# Patient Record
Sex: FEMALE | Race: BLACK OR AFRICAN AMERICAN
[De-identification: names, ages, dates, MRNs, and addresses within clinical notes are randomized per-mention and may not be internally consistent; named-entity substitution may affect disease eponyms.]

---

## 2018-08-29 ENCOUNTER — HOSPITAL ENCOUNTER (EMERGENCY)
Dept: HOSPITAL 74 - JER | Age: 43
Discharge: HOME | End: 2018-08-29
Payer: COMMERCIAL

## 2018-08-29 VITALS — SYSTOLIC BLOOD PRESSURE: 113 MMHG | TEMPERATURE: 98.9 F | HEART RATE: 57 BPM | DIASTOLIC BLOOD PRESSURE: 69 MMHG

## 2018-08-29 VITALS — BODY MASS INDEX: 33.2 KG/M2

## 2018-08-29 DIAGNOSIS — R10.30: Primary | ICD-10-CM

## 2018-08-29 LAB
ALBUMIN SERPL-MCNC: 3.8 G/DL (ref 3.4–5)
ALP SERPL-CCNC: 54 U/L (ref 45–117)
ALT SERPL-CCNC: 26 U/L (ref 12–78)
ANION GAP SERPL CALC-SCNC: 10 MMOL/L (ref 8–16)
APPEARANCE UR: (no result)
AST SERPL-CCNC: 18 U/L (ref 15–37)
BASOPHILS # BLD: 0.7 % (ref 0–2)
BILIRUB SERPL-MCNC: 0.4 MG/DL (ref 0.2–1)
BILIRUB UR STRIP.AUTO-MCNC: NEGATIVE MG/DL
BUN SERPL-MCNC: 9 MG/DL (ref 7–18)
CALCIUM SERPL-MCNC: 8.9 MG/DL (ref 8.5–10.1)
CHLORIDE SERPL-SCNC: 106 MMOL/L (ref 98–107)
CO2 SERPL-SCNC: 24 MMOL/L (ref 21–32)
COLOR UR: YELLOW
CREAT SERPL-MCNC: 0.6 MG/DL (ref 0.55–1.02)
DEPRECATED RDW RBC AUTO: 15 % (ref 11.6–15.6)
EOSINOPHIL # BLD: 0.6 % (ref 0–4.5)
EPITH CASTS URNS QL MICRO: (no result) /HPF
GLUCOSE SERPL-MCNC: 80 MG/DL (ref 74–106)
HCT VFR BLD CALC: 35 % (ref 32.4–45.2)
HGB BLD-MCNC: 11.3 GM/DL (ref 10.7–15.3)
KETONES UR QL STRIP: NEGATIVE
LEUKOCYTE ESTERASE UR QL STRIP.AUTO: NEGATIVE
LYMPHOCYTES # BLD: 38.5 % (ref 8–40)
MCH RBC QN AUTO: 26.7 PG (ref 25.7–33.7)
MCHC RBC AUTO-ENTMCNC: 32.4 G/DL (ref 32–36)
MCV RBC: 82.4 FL (ref 80–96)
MONOCYTES # BLD AUTO: 8.1 % (ref 3.8–10.2)
MUCOUS THREADS URNS QL MICRO: (no result)
NEUTROPHILS # BLD: 52.1 % (ref 42.8–82.8)
NITRITE UR QL STRIP: NEGATIVE
PH UR: 5 [PH] (ref 5–8)
PLATELET # BLD AUTO: 307 K/MM3 (ref 134–434)
PMV BLD: 8.4 FL (ref 7.5–11.1)
POTASSIUM SERPLBLD-SCNC: 3.9 MMOL/L (ref 3.5–5.1)
PROT SERPL-MCNC: 7.7 G/DL (ref 6.4–8.2)
PROT UR QL STRIP: (no result)
PROT UR QL STRIP: NEGATIVE
RBC # BLD AUTO: 4.25 M/MM3 (ref 3.6–5.2)
SODIUM SERPL-SCNC: 140 MMOL/L (ref 136–145)
SP GR UR: 1.02 (ref 1–1.03)
UROBILINOGEN UR STRIP-MCNC: NEGATIVE MG/DL (ref 0.2–1)
WBC # BLD AUTO: 6.9 K/MM3 (ref 4–10)

## 2018-08-29 NOTE — PDOC
Rapid Medical Evaluation


Time Seen by Provider: 08/29/18 16:03


Medical Evaluation: 


 Allergies











Allergy/AdvReac Type Severity Reaction Status Date / Time


 


No Known Drug Allergies Allergy   Verified 08/29/18 16:04











08/29/18 16:05


The patient c/o: currently menstrating, lower abd cramping, no urinary, bowel 

or Gi compaints, no meds taken , no hx of fibriods, gyn d/o


The patient on brief exam: lower suprapubic tenderness, no rebound, no cva 

tenderness


The patient was ordered for: ua, and u/s, tylenol


The patient to proceed to the ED





**Discharge Disposition





- Diagnosis


 Lower abdominal pain








- Referrals





- Patient Instructions





- Post Discharge Activity

## 2018-08-29 NOTE — PDOC
History of Present Illness





- General


Chief Complaint: Pain, Acute


Stated Complaint: ABDOMINAL PRESSURE LOWR BACK PAIN


Time Seen by Provider: 18 16:03


History Source: Patient


Exam Limitations: No Limitations





- History of Present Illness


Initial Comments: 





18 17:20


43 year old female A1 with no PMH presenting to ED complaining of 

suprapubic pain since 1500 today. Her pain radiates to her back. LMP 18. 

She states she is using 3 pads a day, usually she uses 1 a day. She admits to 

clots. Her pain is associated with nausea, lightheadedness. She denies chest 

pain, SOB, syncope, vomiting, diarrhea, constipation, dysuria, flank pain. 





Allergies - NKDA


PCP - Dr. Kelley MEDINA - unknown name








Past History





- Past Medical History


Allergies/Adverse Reactions: 


 Allergies











Allergy/AdvReac Type Severity Reaction Status Date / Time


 


No Known Drug Allergies Allergy   Verified 18 16:04











Home Medications: 


Ambulatory Orders





Aspirin [Aspirin EC] 81 mg PO DAILY 10/28/16 


Pnv95/Iron Fum/Folic Acid [Prenatal Caplet] 1 each PO DAILY 10/28/16 


Ibuprofen [Motrin -] 600 mg PO QID PRN #28 tablet 10/31/16 








Asthma: No


Cardiac Disorders: No


COPD: No


Dementia: No


GI Disorders: No


Other medical history: DENIES.





- Surgical History


Abdominal Surgery: Yes (COLONOSCOPY)





- Suicide/Smoking/Psychosocial Hx


Smoking History: Never smoked


Have you smoked in the past 12 months: No


Hx Alcohol Use: No


Drug/Substance Use Hx: No


Substance Use Type: None





**Review of Systems





- Review of Systems


Able to Perform ROS?: Yes


Comments:: 





18 17:22


General: denies fever, chills, night sweats, generalized weakness.


HEENT: denies sore throat, rhinorrhea, ear pain.


Heart: admits to lightheadedness. denies chest pain, palpitations, syncope, 

lower extremity swelling, diaphoresis.


Respiratory: denies shortness of breath, cough, sputum production, hematemesis.


Abdomen: admits to abdominal pain, nausea. denies vomiting, diarrhea, 

constipation, blood in stool.


: denies dysuria, increased urinary frequency, hematuria, urinary incontinence

, flank pain.


Back: admits to back pain. denies flank pain. 


Neurological: denies headache, dizziness, numbness, tingling, weakness. 


Skin: denies rash, laceration, abrasion.








*Physical Exam





- Vital Signs


 Last Vital Signs











Temp Pulse Resp BP Pulse Ox


 


 98.9 F   57 L  19   113/69   99 


 


 18 16:04  18 16:04  18 16:04  18 16:04  18 16:04














- Physical Exam


Comments: 





18 17:22


Appearance: comfortable, appears well. obese. 


HEENT: head is normocephalic, atraumatic. EOMI. PERRLA. 


Neck: supple. Full ROM.


Heart: regular rhythm. no murmurs, rubs or gallops. No pericardial friction 

rub. 


Lungs: clear to auscultation bilaterally. no crackles, rhonchi or wheezing. no 

stridor.


Abdomen: soft, nontender. normal bowel sounds. no rebound, guarding, masses. 


Extremities: Peripheral pulses intact and equal. No lower extremity edema.


Neurological: Alert. Oriented x3. CN 2-12 grossly intact. Moves all four 

extremities. 





18 17:30


Pelvic examination: cervix visualized, external os closed. No CMT. Blood 

visualized externally, no pooling of blood in vaginal vault. 





ED Treatment Course





- LABORATORY


CBC & Chemistry Diagram: 


 18 18:00





 18 19:00





- RADIOLOGY


Radiology Studies Ordered: 














 Category Date Time Status


 


 TRANSVAGINAL ULTRASOUND US [US] Stat Ultrasound  18 17:04 Ordered














Medical Decision Making





- Medical Decision Making





18 17:30


43 year old female A1 with no PMH presenting to ED for suprapubic pain, 

nausea, and vaginal bleeding. Pt is currently on her period but experiencing 

more bleeding than normal. 





 Initial Vital Signs











Temp Pulse Resp BP Pulse Ox


 


 98.9 F   57 L  19   113/69   99 


 


 18 16:04  18 16:04  18 16:04  18 16:04  18 16:04








Afebrile. No tachycardia. No hypotension. 





Pending labs, TVUS.


 


18 18:56


Urine pregnancy test negative. 


Chemistry and type and screen hemolyzed.


Blood redrawn and sent to lab. 





18 20:35


No anemia. 


Chemistry normal. 


LFTs normal. 


TVUS report - no sonographic abnormality is identified. 





Pt will be discharged with follow up instructions and strict return 

precautions. 











*DC/Admit/Observation/Transfer


Diagnosis at time of Disposition: 


 Lower abdominal pain








- Discharge Dispostion


Disposition: HOME


Condition at time of disposition: Stable


Decision to Admit order: No





- Referrals


Referrals: 


Pilar Benson MD [Primary Care Provider] - 





- Patient Instructions


Printed Discharge Instructions:  DI for Vaginal Bleeding


Additional Instructions: 


You were seen today for abdominal pain and vaginal bleeding.





Your blood work revealed no anemia.


Your electrolytes were normal. Your liver enzymes were normal. 


Your pregnancy test was negative. 


Your ultrasound of your uterus and ovaries was normal. I have included a copy 

in your discharge paperwork. 





Take Tylenol over the counter for your pain. 





Follow up with your OBGYN within 7 days, call their office tomorrow morning and 

make an appointment for this as soon as possible Bring the paperwork given to 

you today with you to your appointment.





Follow up with your Primary Care Doctor within 7 days, call their office 

tomorrow morning and make an appointment for this as soon as possible. Bring 

the paperwork given to you today with you to your appointment.





Return prior to [facility] if you experience 

lightheadedness, passing out, chest pain, shortness of breath, palpitations, 

generalized weakness, profuse bleeding, increasing abdominal pain or any other 

new, worsening or concerning symptoms.








- Post Discharge Activity


Forms/Work/School Notes:  Back to Work

## 2018-08-29 NOTE — PDOC
Attending Attestation





- HPI


HPI: 





18 19:24


The patient is a 43 year old female A1, with no significant past medical 

history, who presents to the ED complaining of abdominal pain starting today. 

She describes her pain as localized in the suprapubic region, ranging from mild 

to moderate, with radiation to the back. She also complaints of excessive 

bleeding from her current menstrual cycle. Usually she goes through 1 pad a day

, currently shes going through 3 pads a day. She noted clots. She also reports 

nausea and lightheadedness associated with her chief complaint. 





The patient denies chest pain, shortness of breath, headache, fever, chills, 

vomiting, diarrhea or constipation. 





Allergies: None 


Past surgical history: None reported 


Social History: No alcohol, tobacco or drug use reported 


PMD:  Dr. Benson 








- Physicial Exam


PE: 





18 19:25


Constitutional: Awake, alert, oriented.  No acute distress.


Head:  Normocephalic.  Atraumatic


Eyes:  PERRL. EOMI.  Conjunctivae are not pale.


ENT:  Mucous membranes are moist and intact. Posterior pharynx without exudates 

or erythema. Uvula midline.


Neck:  Supple.  Full ROM. No lymphadenopathy.


Cardiovascular:  (+) Bradycardia.  Regular rhythm. S1, S2 regular.  Distal 

pulses are 2+ and symmetric.  


Pulmonary/Chest:  No evidence of respiratory distress.  Clear to auscultation 

bilaterally  No wheezing, rales or rhonchi.


Abdominal:  Soft and non-distended.  There is no tenderness.  No rebound, 

guarding or rigidity.  No organomegaly. No palpable masses. Good bowel sounds.


Back:  No CVA tenderness.


Musculoskeletal:  No edema.  No cyanosis.  No clubbing.  Full range of motion 

in all extremities.  Nocalf tenderness. Radial/pedal pulses are intact and 2+ 

bilaterally


Skin:  Skin is warm and dry.  No petechiae.  No purpura.  


Neurological:  Alert and oriented to person, place, and time.  Cranial nerves II

-XII are grossly intact. Normal speech. Strength is grossly symmetric. No 

sensory deficits.


Psychiatric:  Good eye contact.  Normal interaction, affect and behavior.


Pelvic examination: (+) Cervix visualized, external os closed. No CMT. Blood 

visualized externally, no pooling of blood in vaginal vault. 


\





<Lenny Ross - Last Filed: 18 19:25>





- Resident


Resident Name: Aaliyah Tristan





- ED Attending Attestation


I have performed the following: I have examined & evaluated the patient, The 

case was reviewed & discussed with the resident, I agree w/resident's findings 

& plan, Exceptions are as noted





- Medical Decision Making





18 19:05








I, Dr. Cristal Hand, DO, attest that this document has been prepared under 

my direction and personally reviewed by me in its entirety.   I further attest, 

that it accurately reflects all work, treatment, procedures and medical decision

-making performed by me.  


18 19:05


a/p: 42yo female with hx of PCOS with vaginal bleeding since saturday with clots


-hx of DUB and irregular cycles


-Dr. Turner is OB/GYN


-trying to get preg


-last cycle was 1 year ago


-last ob/gyn eval was in May


-not lightheaded or cp


-will send labs, tvus, upreg


-discussed plan with the patient who agrees with the plan


18 20:40


h/h stable


labs reviewed


no uti


hcg negative


ultrasound without acute findings


stable for d/c to home and follow up with ob/gyn as outpt





<Cristal Hand - Last Filed: 18 20:40>